# Patient Record
Sex: FEMALE | Race: OTHER | Employment: FULL TIME | URBAN - METROPOLITAN AREA
[De-identification: names, ages, dates, MRNs, and addresses within clinical notes are randomized per-mention and may not be internally consistent; named-entity substitution may affect disease eponyms.]

---

## 2017-04-24 ENCOUNTER — OFFICE VISIT (OUTPATIENT)
Dept: INTERNAL MEDICINE CLINIC | Age: 42
End: 2017-04-24

## 2017-04-24 VITALS
WEIGHT: 251 LBS | HEIGHT: 64 IN | TEMPERATURE: 98.6 F | HEART RATE: 80 BPM | OXYGEN SATURATION: 99 % | SYSTOLIC BLOOD PRESSURE: 124 MMHG | DIASTOLIC BLOOD PRESSURE: 82 MMHG | BODY MASS INDEX: 42.85 KG/M2 | RESPIRATION RATE: 18 BRPM

## 2017-04-24 DIAGNOSIS — M05.79 RHEUMATOID ARTHRITIS INVOLVING MULTIPLE SITES WITH POSITIVE RHEUMATOID FACTOR (HCC): Primary | ICD-10-CM

## 2017-04-24 DIAGNOSIS — R77.9 ELEVATED BLOOD PROTEIN: ICD-10-CM

## 2017-12-21 ENCOUNTER — OFFICE VISIT (OUTPATIENT)
Dept: INTERNAL MEDICINE CLINIC | Age: 42
End: 2017-12-21

## 2017-12-21 VITALS
DIASTOLIC BLOOD PRESSURE: 83 MMHG | RESPIRATION RATE: 18 BRPM | TEMPERATURE: 98.4 F | WEIGHT: 240 LBS | OXYGEN SATURATION: 99 % | HEIGHT: 64 IN | HEART RATE: 80 BPM | BODY MASS INDEX: 40.97 KG/M2 | SYSTOLIC BLOOD PRESSURE: 129 MMHG

## 2017-12-21 DIAGNOSIS — Z20.89 EXPOSURE TO MENINGITIS: Primary | ICD-10-CM

## 2017-12-21 PROBLEM — E66.01 OBESITY, MORBID (HCC): Status: ACTIVE | Noted: 2017-12-21

## 2017-12-21 RX ORDER — CIPROFLOXACIN 500 MG/1
500 TABLET ORAL DAILY
Qty: 1 TAB | Refills: 0 | Status: SHIPPED | OUTPATIENT
Start: 2017-12-21 | End: 2017-12-22

## 2017-12-21 NOTE — PROGRESS NOTES
HISTORY OF PRESENT ILLNESS  Mamadou Nath is a 43 y.o. female. HPI Ms. Bateman is here to discuss exposure to bacterial meningitis. She had seen her aunt Tuesday who was diagnosed today with bacterial meningitis. Her aunt had been having mental status changes. She helped cleaned up her house after she was taken to the hospital. She had hugged her but had no other contact with her. She states her aunt's doctor at the hospital advised the family members to be treated. She states she feels completely fine at this moment. Review of Systems   Constitutional: Negative for fever and malaise/fatigue. HENT: Negative. Eyes: Negative. Respiratory: Negative. Cardiovascular: Negative. Musculoskeletal: Negative for back pain and neck pain. Neurological: Negative for dizziness and headaches. Physical Exam   Constitutional: She is oriented to person, place, and time. She appears well-developed and well-nourished. No distress. Cardiovascular: Normal rate. Pulmonary/Chest: Effort normal.   Neurological: She is alert and oriented to person, place, and time. Psychiatric: She has a normal mood and affect. Her behavior is normal. Judgment and thought content normal.     Visit Vitals    /83 (BP 1 Location: Left arm, BP Patient Position: Sitting)    Pulse 80    Temp 98.4 °F (36.9 °C) (Oral)    Resp 18    Ht 5' 4\" (1.626 m)    Wt 240 lb (108.9 kg)    SpO2 99%    BMI 41.2 kg/m2       ASSESSMENT and PLAN    ICD-10-CM ICD-9-CM    1. Exposure to meningitis Z20.89 V01.89 ciprofloxacin HCl (CIPRO) 500 mg tablet   Discussed there are a few options for prophylactic treatment of bacterial meningitis. Will give the cipro 500 X1. I let her know that if her aunt's doctors suggest or advise that family members be treated differently, for her to let me know.

## 2017-12-21 NOTE — PROGRESS NOTES
Chief Complaint   Patient presents with    Other     Pts aunt was dx/ed with bacterial meningitis. She has been around her caring for her    1. Have you been to the ER, urgent care clinic since your last visit? Hospitalized since your last visit? No    2. Have you seen or consulted any other health care providers outside of the [address] [place] [address] since your last visit? Include any pap smears or colon screening.  No

## 2018-01-22 ENCOUNTER — OFFICE VISIT (OUTPATIENT)
Dept: INTERNAL MEDICINE CLINIC | Age: 43
End: 2018-01-22

## 2018-01-22 VITALS
HEART RATE: 84 BPM | WEIGHT: 244 LBS | OXYGEN SATURATION: 98 % | TEMPERATURE: 98.1 F | DIASTOLIC BLOOD PRESSURE: 76 MMHG | SYSTOLIC BLOOD PRESSURE: 119 MMHG | BODY MASS INDEX: 41.66 KG/M2 | RESPIRATION RATE: 18 BRPM | HEIGHT: 64 IN

## 2018-01-22 DIAGNOSIS — R09.89 SENSATION OF FOREIGN BODY IN THROAT: ICD-10-CM

## 2018-01-22 DIAGNOSIS — J39.2 THROAT IRRITATION: Primary | ICD-10-CM

## 2018-01-22 NOTE — MR AVS SNAPSHOT
Donnie Peoples 
 
 
 [facility] [address] [facility] [address] 
[phone] Patient: Demetria Parker MRN: [id number] WKT:[birth year] Visit Information Date & Time Provider Department Dept. Phone Encounter #  
 1/22/2018  4:00 PM Finesse Mora PA-C [facility]ain [phone] Follow-up Instructions Return if symptoms worsen or fail to improve. Upcoming Health Maintenance Date Due  
 PAP AKA CERVICAL CYTOLOGY 8/13/1996 Influenza Age 5 to Adult 8/1/2017 DTaP/Tdap/Td series (2 - Td) 4/24/2027 Allergies as of 1/22/2018  Review Complete On: 1/22/2018 By: Finesse Mora PA-C Severity Noted Reaction Type Reactions Ibuprofen  03/22/2013    Other (comments) Current Immunizations  Never Reviewed No immunizations on file. Not reviewed this visit You Were Diagnosed With   
  
 Codes Comments Throat irritation    -  Primary ICD-10-CM: J39.2 ICD-9-CM: 478.29 Sensation of foreign body in throat     ICD-10-CM: R09.89 ICD-9-CM: 784.99 Vitals BP Pulse Temp Resp Height(growth percentile) Weight(growth percentile) 119/76 (BP 1 Location: Left arm, BP Patient Position: Sitting) 84 98.1 °F (36.7 °C) (Oral) 18 5' 4\" (1.626 m) 244 lb (110.7 kg) LMP SpO2 BMI OB Status Smoking Status 01/19/2018 98% 41.88 kg/m2 Having regular periods Former Smoker Vitals History BMI and BSA Data Body Mass Index Body Surface Area  
 41.88 kg/m 2 2.24 m 2 Preferred Pharmacy Pharmacy Name Phone [facility] [address] [place] [address] [phone] Your Updated Medication List  
  
   
This list is accurate as of: 1/22/18  5:55 PM.  Always use your most recent med list.  
  
  
  
  
 certolizumab pegol 500 mg/2 mL (200 mg/mL x 2) Sykt injection Commonly known as:  CIMZIA  
200 mg by SubCUTAneous route once. One injection every other week. cholecalciferol (vitamin D3) 2,000 unit Tab Take  by mouth. methotrexate 2.5 mg tablet Commonly known as:  Madeleine Alken Take 15 mg by mouth Every Friday. ULTRAM  mg Tb24 Generic drug:  traMADol Take 100 mg by mouth daily. We Performed the Following REFERRAL TO ENT-OTOLARYNGOLOGY [EEL81 Custom] Comments:  
 Isaias EDMONDS or Dr. Jeffrey Alberto Follow-up Instructions Return if symptoms worsen or fail to improve. Referral Information Referral ID Referred By Referred To  
  
 [id number] [facility] Ice Not Available Visits Status Start Date End Date 1 New Request 1/22/18 1/22/19 If your referral has a status of pending review or denied, additional information will be sent to support the outcome of this decision. Introducing [facility]! Vamshi Mulligan introduces [employer] patient portal. Now you can access parts of your medical record, email your doctor's office, and request medication refills online. 1. In your internet browser, go to [email or web address] 2. Click on the First Time User? Click Here link in the Sign In box. You will see the New Member Sign Up page. 3. Enter your [employer] Access Code exactly as it appears below. You will not need to use this code after youve completed the sign-up process. If you do not sign up before the expiration date, you must request a new code. · [employer] Access Code: [email or web address] Expires: 3/21/2018  4:02 PM 
 
4. Enter the last four digits of your Social Security Number (xxxx) and Date of Birth (mm/dd/yyyy) as indicated and click Submit. You will be taken to the next sign-up page. 5. Create a [employer] ID. This will be your [employer] login ID and cannot be changed, so think of one that is secure and easy to remember. 6. Create a [employer] password. You can change your password at any time. 7. Enter your Password Reset Question and Answer.  This can be used at a later time if you forget your password. 8. Enter your e-mail address. You will receive e-mail notification when new information is available in [address] 9. Click Sign Up. You can now view and download portions of your medical record. 10. Click the Download Summary menu link to download a portable copy of your medical information. If you have questions, please visit the Frequently Asked Questions section of the [employer] website. Remember, [employer] is NOT to be used for urgent needs. For medical emergencies, dial 911. Now available from your iPhone and Android! Please provide this summary of care documentation to your next provider. Your primary care clinician is listed as Don Schroeder. If you have any questions after today's visit, please call [phone].

## 2018-01-22 NOTE — PROGRESS NOTES
HISTORY OF PRESENT ILLNESS  Tawana Miranda is a 43 y.o. female. HPI Ms. Bateman is here for 2 month history of sensation of something in her throat. She describes it as feeling like a pill is caught in her throat. She denies any trauma, vomiting, choking episode prior to onset of her sx. She denies hoarseness or coughing. She is a prior smoker. She does not have difficulty swallowing solids or liquids. She has had an endoscopy several years ago and was told she has a hiatal hernia. Review of Systems   Constitutional: Negative. HENT: Negative for congestion and sore throat. Respiratory: Negative for cough and shortness of breath. Cardiovascular: Negative. Gastrointestinal: Negative for heartburn. Physical Exam   Constitutional: She is oriented to person, place, and time. She appears well-developed and well-nourished. No distress. HENT:   Head: Normocephalic and atraumatic. Mouth/Throat: Posterior oropharyngeal erythema (mild erythema) present. No oropharyngeal exudate. Eyes: Conjunctivae are normal.   Neck: No thyromegaly present. Cardiovascular: Normal rate and regular rhythm. No murmur heard. Pulmonary/Chest: Effort normal and breath sounds normal. She has no wheezes. Lymphadenopathy:     She has no cervical adenopathy. Neurological: She is alert and oriented to person, place, and time. Visit Vitals    /76 (BP 1 Location: Left arm, BP Patient Position: Sitting)    Pulse 84    Temp 98.1 °F (36.7 °C) (Oral)    Resp 18    Ht 5' 4\" (1.626 m)    Wt 244 lb (110.7 kg)    SpO2 98%    BMI 41.88 kg/m2       ASSESSMENT and PLAN    ICD-10-CM ICD-9-CM    1. Throat irritation J39.2 478.29 REFERRAL TO ENT-OTOLARYNGOLOGY   2. Sensation of foreign body in throat R09.89 784.99 REFERRAL TO ENT-OTOLARYNGOLOGY     Pt verbalized understanding of their condition and diagnoses, treatment plan,  as well as side effects of any new medications prescribed.

## 2018-01-22 NOTE — PROGRESS NOTES
Chief Complaint   Patient presents with    Other     pt states she fells a small lump in the middle bottom of her throat      1. Have you been to the ER, urgent care clinic since your last visit? Hospitalized since your last visit? No    2. Have you seen or consulted any other health care providers outside of the [address] [place] [address] since your last visit? Include any pap smears or colon screening.  No

## 2018-10-24 ENCOUNTER — OFFICE VISIT (OUTPATIENT)
Dept: ORTHOPEDIC SURGERY | Age: 43
End: 2018-10-24

## 2018-10-24 VITALS
DIASTOLIC BLOOD PRESSURE: 88 MMHG | HEART RATE: 106 BPM | OXYGEN SATURATION: 97 % | WEIGHT: 250.6 LBS | BODY MASS INDEX: 42.78 KG/M2 | TEMPERATURE: 97.7 F | RESPIRATION RATE: 16 BRPM | SYSTOLIC BLOOD PRESSURE: 143 MMHG | HEIGHT: 64 IN

## 2018-10-24 DIAGNOSIS — M79.672 LEFT FOOT PAIN: ICD-10-CM

## 2018-10-24 DIAGNOSIS — M76.61 ACHILLES TENDINITIS OF RIGHT LOWER EXTREMITY: Primary | ICD-10-CM

## 2018-10-24 DIAGNOSIS — M25.571 ACUTE RIGHT ANKLE PAIN: ICD-10-CM

## 2018-10-24 RX ORDER — CELECOXIB 50 MG/1
50 CAPSULE ORAL 2 TIMES DAILY
COMMUNITY
End: 2018-11-14

## 2018-10-24 NOTE — PROGRESS NOTES
AMBULATORY PROGRESS NOTE      Patient: Bayron Gibbs             MRN: [id number]     SSN: [id number] Body mass index is 43.02 kg/m². YOB: 1975     AGE: 37 y.o. EX: female    PCP: Eric Mera PA-C     IMPRESSION/DIAGNOSIS AND TREATMENT PLAN     DIAGNOSES  1. Achilles tendinitis of right lower extremity    2. Acute right ankle pain    3. Left foot pain        Orders Placed This Encounter    AMB SUPPLY ORDER    [09868] Ankle Min 3V    [52363] Foot Min 3V      Birdie Bateman understands her diagnoses and the proposed plan. Plan:    1) DME Order: Short right CAM walker boot with rubber heel. 2) EUGENIE  [facility], has educated the patient on Achilles tendon specific exercises and/or stretches. 3) Work Note: Please allow Ms. Bateman to use her discretion to remain seated at work. RTO - 3 weeks     HPI AND EXAMINATION     Birdie Bateman IS A 37 y.o. female who presents to my outpatient office complaining of bilateral foot pain. Ms. Liss Watters reports that she has been having pain in her right Achilles tendon for the past few months. She notes it is painful to go up and down steps, which she must do frequently for her job. She had the past three days off from her work and returned today, and she reports that within an hour, her pain increased significantly . She further reports that she limps frequently. Ms. Liss Watters notes that she has been performing stretches and exercises, as she originally believed she had plantar fasciitis. The patient denies any recent falls, twists, or trauma. The patient denies exposure to fluoroquinolone. She recently went to her Rheumatologist, Dr. Helio Chaparro, who ordered an US of her achilles tendon and reports that she was told her Achilles tendon was very inflamed, but not torn. Additionally, the patient was recently diagnosed with psoriasis and her Rheumatologist believes she may have psoriatic arthritis.  The patient recently had tests returning negative for blood clots ordered by her Rheumatologist. XR imaging has been reviewed with the patient. The patient has a h/o of stomach ulcers and is currently taking Celebrex. 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 ANKLE/FOOT right    Psychiatry: Alert, Oriented x 3; Speech normal in context and clarity,            Memory intact grossly, no involuntary movements - tremors, no dementia  Gait: Normal  Tenderness: Mild tenderness to the non-insertional point of the Achilles tendon    Moderate tenderness to the insertional portion of Achilles tendon. Cutaneous: No redness or warmth  Joint Motion: Not tested at this time  Joint / Tendon Stability: No Ankle or Subtalar instability or joint laxity. No peroneal sublux ability or dislocation  Alignment: Forefoot, Midfoot, Hindfoot WNL. Neuro Motor/Sensory: NL/NL. Vascular: NL foot/ankle pulses. Lymphatics: No extremity lymphedema, No calf swelling, no tenderness to calf muscles. CHART REVIEW     Past Medical History:   Diagnosis Date    Arthritis     rheumatoid and psoriatic    Chronic pain      Current Outpatient Medications   Medication Sig    Celecoxib (CELEBREX) 50 mg capsule Take 50 mg by mouth two (2) times a day.  cholecalciferol, vitamin D3, 2,000 unit tab Take  by mouth.  certolizumab pegol (CIMZIA) 400 mg/2 mL (200 mg/mL x 2) sykt injection 200 mg by SubCUTAneous route once. One injection every other week.  traMADol (ULTRAM ER) 100 mg tablet Take 100 mg by mouth daily.  methotrexate (RHEUMATREX) 2.5 mg tablet Take 15 mg by mouth Every Friday. No current facility-administered medications for this visit.       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 Neg Weight Loss  Cardiovascular: Negative for chest pain, claudication and leg swelling. SOB, SANCHEZ   Gastrointestinal/Urological: Negative for  pain, N/V/D/C, Blood in stool or urine,dysuria                         Hematuria, Incontinence, pelvic pain  Musculoskeletal: see HPI. Neurological: Negative for dizziness and weakness, headaches,Visual Changes             Confusion,  Or Seizures,   Psychiatric/Behavioral: Negative for depression, memory loss and substance abuse. Extremities:  Negative for hair changes, rash or skin lesion changes. Hematologic: Negative for Bleeding problems, bruising, pallor or swollen lymph nodes. Peripheral Vascular: No calf pain, vascular vein tenderness to calf pain              No calf throbbing, posterior knee throbbing pain     DIAGNOSTIC IMAGING     ANKLE X RAYS 3 VIEWS Bilateral   10/24/2018    FINDINGS:     SOFT TISSUES:              Absent soft tissue swelling, No soft tissue calcifications, No Calcified blood vessels     Soft tissue swelling location:    None to fibula region        None medial aspect    None dorsal midfoot/forefoot  No radiopaque foreign body, abnormal lucency, or focal swelling noted within soft tissues.      OSSEOUS:     No fractures, subluxations, dislocations   Bone spur to inferior aspect of calcaneus is  mild   Bone spur to superior calcaneus region is           large SIZED CALCIFIC INSERTIONAL BONE SPUR IS PRESENT (RIGHT/LEFT)   Mineralization: suggests no Osteopenia or no Osteoporosis    ALIGNMENT:    Ankle mortise alignment is congruent. Tibial plafond and talar dome intact      No Osteochondral defects seen    No Ankle joint effusion seen         I have personally reviewed these images of the above study. The interpretation of this study is my professional opinion. Yasmany Mendoza MD  10/24/2018  5:23 PM      Please see above section of this report. I have personally reviewed the results of the above study. The interpretation of this study is my professional opinion. Written by Chris Keith, as dictated by Dr. Felicitas Curry. I, Dr. Felicitas Curry, confirm that all documentation is accurate.

## 2018-10-24 NOTE — LETTER
NOTIFICATION RETURN TO WORK / SCHOOL 
 
10/24/2018 4:15 PM 
 
Ms. Birdie Mehta [address] [address] To Whom It May Concern: 
 
Sloane Alexandre is currently under the care of [address] [place] [address] Jeffrey Eric. Please allow Ms. Bateman to use her discretion to remain seated at work. If there are questions or concerns please have the patient contact our office.  
 
 
 
Sincerely, 
 
 
Destinee Ambrocio MD

## 2018-10-24 NOTE — PATIENT INSTRUCTIONS
Please follow up in 3 weeks. You are advised to contact us if your condition worsens.          Achilles Tendinitis: Exercises     Your Care Instructions  Here are stretching and strengthening exercises for your achilles tendon. Start each exercise slowly. Ease off the exercise if you start to have pain. Your doctor or  will tell you when you can start these exercises and which ones will work best for you. Toe stretch    1. Sit in a chair, and extend your affected leg so that your heel is on the floor. 2. With your hand, reach down and pull your big toe up and back. Pull toward your ankle and away from the floor. 3. Hold the position for at least 20 to 30 seconds. 4. Repeat 3 times a session, up to 5 sessions a day. Towel stretch (calf)    1. Sit with your legs extended and knees straight. 2. Place a towel around your foot just under the toes. 3. Hold each end of the towel in each hand, with your hands above your knees. 4. Pull back with the towel so that your foot stretches toward you. 5. Hold the position for at least 20 to 30 seconds. 6. Repeat 3 times a session, up to 5 sessions a day. Floor stretch (calf)    1. Stand about 2 feet from a wall, and place your hands on the wall at about shoulder height. Or you can stand behind a chair, placing your hands on the back of it for balance. 2. Step back with the leg you want to stretch. Keep the leg straight, and press your heel into the floor with your toe turned slightly in.  3. Lean forward, and bend your other leg slightly. Feel the stretch in the Achilles tendon of your back leg. Hold for at least 15 to 30 seconds. Repeat with back knee slightly bent. 4. Repeat 3 times a session, up to 5 sessions a day. Stair stretch    1. Stand with the balls of both feet on the edge of a step or curb. With at least one hand, hold onto something solid for balance, such as a banister or handrail.   2. Keeping your affected leg straight, slowly let that heel hang down off of the step or curb until you feel a stretch in the back of your calf and/or Achilles area. Some of your weight should still be on the other leg. 3. Hold this position for at least 20 to 30 seconds. 4. Repeat 3 times a session, up to 5 times a day or whenever your Achilles tendon starts to feel tight. This stretch should also be repeated with your knee slightly bent. If too easy, progress to one leg at a time. Heel raises (eccentric emphasis)    1. Stand on a step with your heel off the edge of the step. Hold on to a handrail or wall for balance. 2. Push up on your toes, then slowly count to 10 as you lower yourself back down until your heel is below the step. If it hurts to push up on your toes, try putting most of your weight on your other foot as you push up, or try using your arms to help you. If you can't do this exercise without causing pain, stop the exercise and talk to your doctor. 3. Repeat the exercise 10 times. Repeat exercise with the knee slightly bent. If too easy, progress to one leg at a time. Ice Massage (pain relief)      Fill small paper cup with water and freeze until needed. When frozen and ready for use, tear off top half inch of cup, exposing the ice. Place ice directly on your heel cord (or wherever you have pain). Move the ice in a circular motion for up to 5 minutes (no more). Use towels to catch the water drippings. You should feel 4 stages of sensations starting with. ..  1. Uncomfortable sensation of cold, then  2. Stinging, then  3. Burning or aching feeling, then  4. Numbness    If the pain is too great to handle, lift it off your skin for a few seconds, dab with towel and then place it back on for a few circular motions and repeat.     **Do not perform for more than 5 minutes or you may run the risk of frost bite and cause death to the tissue.

## 2018-11-14 ENCOUNTER — OFFICE VISIT (OUTPATIENT)
Dept: ORTHOPEDIC SURGERY | Age: 43
End: 2018-11-14

## 2018-11-14 VITALS
TEMPERATURE: 97.4 F | BODY MASS INDEX: 42.85 KG/M2 | WEIGHT: 251 LBS | RESPIRATION RATE: 16 BRPM | SYSTOLIC BLOOD PRESSURE: 130 MMHG | HEIGHT: 64 IN | HEART RATE: 83 BPM | OXYGEN SATURATION: 99 % | DIASTOLIC BLOOD PRESSURE: 79 MMHG

## 2018-11-14 DIAGNOSIS — M76.61 ACHILLES TENDINITIS OF RIGHT LOWER EXTREMITY: Primary | ICD-10-CM

## 2018-11-14 RX ORDER — LIDOCAINE 50 MG/G
1 PATCH TOPICAL EVERY 24 HOURS
Qty: 1 PACKAGE | Refills: 0 | Status: SHIPPED | OUTPATIENT
Start: 2018-11-14 | End: 2021-12-22

## 2018-11-14 RX ORDER — DICLOFENAC SODIUM 10 MG/G
4 GEL TOPICAL 2 TIMES DAILY
Qty: 100 G | Refills: 2 | Status: SHIPPED | OUTPATIENT
Start: 2018-11-14 | End: 2021-12-22

## 2018-11-14 NOTE — PROGRESS NOTES
AMBULATORY PROGRESS NOTE      Patient: Cristin Cho             MRN: [id number]     SSN: [id number] Body mass index is 43.08 kg/m². YOB: 1975     AGE: 37 y.o. EX: female    PCP: Keily Desai PA-C     IMPRESSION/DIAGNOSIS AND TREATMENT PLAN     DIAGNOSES  1. Achilles tendinitis of right lower extremity        Orders Placed This Encounter    AMB SUPPLY ORDER    REFERRAL TO PHYSICAL THERAPY      Birdie Bateman understands her diagnoses and the proposed plan. Plan:    1) Referral to Physical Therapy. 2) Voltaren 1% gel: 4 g BID; 100 g, 2 refill. 3) Lidoderm 5% Patch: Cut to fit Q24HS; 1 Patch, 0 refill. 4) Wean CAM walker boot. 5) DME Order: Visco heel inserts (LMS). 6) Continue activity as tolerated. RTO - 3 weeks     HPI AND EXAMINATION     Birdie Bateman IS A 37 y.o. female who presents to my outpatient office for follow up of Achilles tendinitis of the RLE. At the last visit, I provided an order for a short right CAM walker boot with rubber heel, an HEP with Achilles tendon specific exercises, and a work note. Since we saw her last, Ms. Rach Campos states that she is in the same level pain as she was before, which has been occurring for 3 months. She describes this pain as a burning sensation. She reports that she has been doing her exercises as directed. She notes that she was doing these exercises the other day and heard a pop in the bottom of her right foot, which resulted in a knot. She now has pain and soreness in the bottom of her foot. She denies any bruising when this occurred. She presents in the office today with her CAM walker boot. Of note, she had US of a tendon in her wrist at [facility] recently. The patient states she uses Voltaren gel on her hand and notes that it helps. The patient has a h/o of stomach ulcers and RA, and is currently taking Celebrex.  She used to take Plaquenil, which she discontinued due to the stomach ulcers.      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 Visit Vitals  /79   Pulse 83   Temp 97.4 °F (36.3 °C) (Oral)   Resp 16   Ht 5' 4\" (1.626 m)   Wt 251 lb (113.9 kg)   SpO2 99%   BMI 43.08 kg/m²     ANKLE/FOOT right     Psychiatry: Alert, Oriented x 3; Speech normal in context and clarity,                       Memory intact grossly, no involuntary movements - tremors, no dementia  Gait: Normal  Tenderness: Mild tenderness to the non-insertional point of the Achilles tendon                          Moderate tenderness to the insertional portion of Achilles tendon. Mild tenderness to plantar fascia, medial cord  Cutaneous: No redness or warmth  Joint Motion: Not tested at this time  Joint / Tendon Stability: No Ankle or Subtalar instability or joint laxity. No peroneal sublux ability or dislocation  Alignment: Forefoot, Midfoot, Hindfoot WNL. Neuro Motor/Sensory: NL/NL. Vascular: NL foot/ankle pulses. Lymphatics: No extremity lymphedema, No calf swelling, no tenderness to calf muscles. CHART REVIEW     Past Medical History:   Diagnosis Date    Arthritis     rheumatoid and psoriatic    Chronic pain      Current Outpatient Medications   Medication Sig    Celecoxib (CELEBREX) 50 mg capsule Take 50 mg by mouth two (2) times a day.  cholecalciferol, vitamin D3, 2,000 unit tab Take  by mouth.  certolizumab pegol (CIMZIA) 400 mg/2 mL (200 mg/mL x 2) sykt injection 200 mg by SubCUTAneous route once. One injection every other week.  methotrexate (RHEUMATREX) 2.5 mg tablet Take 15 mg by mouth Every Friday.  traMADol (ULTRAM ER) 100 mg tablet Take 100 mg by mouth daily. No current facility-administered medications for this visit.       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 Neg Weight Loss  Cardiovascular: Negative for chest pain, claudication and leg swelling. SOB, SANCHEZ   Gastrointestinal/Urological: Negative for  pain, N/V/D/C, Blood in stool or urine,dysuria                         Hematuria, Incontinence, pelvic pain  Musculoskeletal: see HPI. Neurological: Negative for dizziness and weakness, headaches,Visual Changes             Confusion,  Or Seizures,   Psychiatric/Behavioral: Negative for depression, memory loss and substance abuse. Extremities:  Negative for hair changes, rash or skin lesion changes. Hematologic: Negative for Bleeding problems, bruising, pallor or swollen lymph nodes. Peripheral Vascular: No calf pain, vascular vein tenderness to calf pain              No calf throbbing, posterior knee throbbing pain     DIAGNOSTIC IMAGING     No notes on file    Please see above section of this report. I have personally reviewed the results of the above study. The interpretation of this study is my professional opinion. Written by Chema Maradiaga, as dictated by Dr. Melvina Heller. I, Dr. Melvina Heller, confirm that all documentation is accurate.

## 2018-11-14 NOTE — PATIENT INSTRUCTIONS
Please follow up in 3 weeks. You are advised to contact us if your condition worsens. You have been provided with an order for durable medical equipment that you may  at an outside facility as our office does not carry the equipment you need. You may pick it up at any medical supply company you like. Listed below are a few different locations for your convenience:    [address]  [address], [address]  Phone: [phone] [address] [facility],[address]  [address], [facility] [address]  Phone: [phone]  Phone: [phone]  [place]:   [address], [address] [place]:  [address] [facility] [address]  [place]:  [facility],[address] [place], [address]      Achilles Tendon: Exercises  Your Care Instructions  Here are some examples of exercises for your achilles tendon. Start each exercise slowly. Ease off the exercise if you start to have pain. Your doctor or physical therapist will tell you when you can start these exercises and which ones will work best for you. Toe stretch  Toe stretch    1. Sit in a chair, and extend your affected leg so that your heel is on the floor. 2. With your hand, reach down and pull your big toe up and back. Pull toward your ankle and away from the floor. 3. Hold the position for at least 15 to 30 seconds. 4. Repeat 2 to 4 times a session, several times a day. Calf-plantar fascia stretch    1. Sit with your legs extended and knees straight. 2. Place a towel around your foot just under the toes. 3. Hold each end of the towel in each hand, with your hands above your knees. 4. Pull back with the towel so that your foot stretches toward you. 5. Hold the position for at least 15 to 30 seconds. 6. Repeat 2 to 4 times a session, up to 5 sessions a day.     Floor stretch    1. Stand about 2 feet from a wall, and place your hands on the wall at about shoulder height. Or you can stand behind a chair, placing your hands on the back of it for balance. 2. Step back with the leg you want to stretch. Keep the leg straight, and press your heel into the floor with your toe turned slightly in.  3. Lean forward, and bend your other leg slightly. Feel the stretch in the Achilles tendon of your back leg. Hold for at least 15 to 30 seconds. 4. Repeat 2 to 4 times a session, up to 5 sessions a day. Stair stretch    1. Stand with the balls of both feet on the edge of a step or curb (or a medium-sized phone book). With at least one hand, hold onto something solid for balance, such as a banister or handrail. 2. Keeping your affected leg straight, slowly let that heel hang down off of the step or curb until you feel a stretch in the back of your calf and/or Achilles area. Some of your weight should still be on the other leg. 3. Hold this position for at least 15 to 30 seconds. 4. Repeat 2 to 4 times a session, up to 5 times a day or whenever your Achilles tendon starts to feel tight. This stretch can also be done with your knee slightly bent. Strength exercise    1. This exercise will get you started on building strength after an Achilles tendon injury. Your doctor or physical therapist can help you move on to more challenging exercises as you heal and get stronger. 2. Stand on a step with your heel off the edge of the step. Hold on to a handrail or wall for balance. 3. Push up on your toes, then slowly count to 10 as you lower yourself back down until your heel is below the step. If it hurts to push up on your toes, try putting most of your weight on your other foot as you push up, or try using your arms to help you. If you can't do this exercise without causing pain, stop the exercise and talk to your doctor. 4. Repeat the exercise 8 to 12 times, half with the knee straight and half with the knee bent.     Follow-up care is a key part of your treatment and safety. Be sure to make and go to all appointments, and call your doctor if you are having problems. It's also a good idea to know your test results and keep a list of the medicines you take. Where can you learn more? Go to http://marlene-willard.info/. Enter N052 in the search box to learn more about \"Achilles Tendon: Exercises. \"  Current as of: November 29, 2017  Content Version: 11.8  © 0072-5777 Healthwise, [employer]. Care instructions adapted under license by [employer] (which disclaims liability or warranty for this information). If you have questions about a medical condition or this instruction, always ask your healthcare professional. [address] any warranty or liability for your use of this information.

## 2018-11-14 NOTE — PROGRESS NOTES
Verbal order given by Dr. Cassia Pierre to sign order for PT and DME entered by [facility].

## 2018-11-15 ENCOUNTER — APPOINTMENT (OUTPATIENT)
Dept: PHYSICAL THERAPY | Age: 43
End: 2018-11-15

## 2018-11-26 ENCOUNTER — HOSPITAL ENCOUNTER (OUTPATIENT)
Dept: PHYSICAL THERAPY | Age: 43
Discharge: HOME | End: 2018-11-26
Payer: COMMERCIAL

## 2018-11-26 PROCEDURE — 97110 THERAPEUTIC EXERCISES: CPT

## 2018-11-26 PROCEDURE — 97162 PT EVAL MOD COMPLEX 30 MIN: CPT

## 2018-11-26 NOTE — PROGRESS NOTES
[facility] [address] [place] [address]  [address], [address] [place] [address], [address]  Phone: [phone]  Fax: [phone] / [address] [address] PHYSICAL THERAPY SERVICES  Patient Name: Irma Campbell : 1975   Medical   Diagnosis: Achilles tendinitis, right leg [M76.61] Treatment Diagnosis: R heel pain   Onset Date: 2 months prior to eval     Referral Source: Prince Raúl MD Start of [facility]): 2018   Prior Hospitalization: See medical history Provider #: [id number]   Prior Level of Function: Walked dog > 1mi daily   Comorbidities: RA, L calcaneal bone spurs   Medications: Verified on Patient Summary List   The Plan of Care and following information is based on the information from the initial evaluation.   ===========================================================================================  Assessment / key information: Patient is a 37 y.o. female who presents to [facility] with diagnosis of R Achilles tendinitis. Patient reports onset of R Achilles T pain beginning 2 months ago, insidious onset. She reports she wore CAM boot for 4 weeks but SX worsened due to pressure of boot on heel. X-rays reveal 2 calcaneal bone spurs (@ AT insertion and sub calc) per pt report. Pain is worse by end of work day while walking and after sitting >15 min. Objective PT examination findings include (1) dec ankle AROM DF 0 deg with p! (2) dec strength INV/DF/PF 3+/5 p!, EV 4/5 (3) dec length gastroc/soleus complex (4) swelling along AT insertion (5) hypomobile calcaneal med/lat glides. A home exercise program was demonstrated and provided to address the above objective and functional deficits. Patient can benefit from skilled PT interventions to improve flexibility, decrease pain, to facilitate performance of ADLs & improve overall functional status. ===========================================================================================  Eval Complexity: History MEDIUM  Complexity : 1-2 comorbidities / personal factors will impact the outcome/ POC ;  Examination  MEDIUM Complexity : 3 Standardized tests and measures addressing body structure, function, activity limitation and / or participation in recreation ; Presentation LOW Complexity : Stable, uncomplicated ;  Decision Making MEDIUM Complexity : FOTO score of 26-74; Overall Complexity LOW   Problem List: pain affecting function, decrease ROM, decrease strength, edema affecting function, impaired gait/ balance, decrease ADL/ functional abilitiies, decrease activity tolerance and decrease flexibility/ joint mobility, FOTO score 47  Treatment Plan may include any combination of the following: Therapeutic exercise, Therapeutic activities, Neuromuscular re-education, Physical agent/modality, Gait/balance training, Manual therapy including dry needling, Aquatic therapy and Patient education  Patient / Family readiness to learn indicated by: asking questions, trying to perform skills and interest  Persons(s) to be included in education: patient (P)  Barriers to Learning/Limitations: no  Measures taken:    Patient Goal (s): \"pain free walking\"   Patient self reported health status: good  Rehabilitation Potential: good   Short Term Goals: To be accomplished in  1-2  weeks:  1) Patient to be independent and compliant with initial HEP to prevent further disability. 2) Improve FOTO score to 57 indicating significant functional improvement. 3) Patient will report decreased c/o pain to < or = 2/10 to facilitate ease with walking with manageable sx in R heel. 4) Improve R ankle DF AROM to >/= 5 deg so ROM is available for improved gait mechanics   Long Term Goals:  To be accomplished in  3-4  weeks:  1) Patient to be independent & compliant with a progressive, high level HEP in order to maintain gains made in physical therapy. 2) Improve FOTO score to 67 indicating significant functional improvement in order to return to PLOF. 3) Patient to tolerate walking 0.5 mi without exacerbation of SX to return to walking her dog daily. Frequency / Duration:   Patient to be seen  2-3  times per week for 3-4  weeks:  Patient / Caregiver education and instruction: self care, activity modification and exercises  G-Codes (GP): NA  Therapist Signature: Gary Reed PT, DPT, MTC, CMTPT Date: 22/38/2288   Certification Period: NA Time: 8:06 AM   ===========================================================================================  I certify that the above Physical Therapy Services are being furnished while the patient is under my care. I agree with the treatment plan and certify that this therapy is necessary. Physician Signature:        Date:       Time:     Please sign and return to [facility] [place] or you may fax the signed copy to [phone]. Thank you.

## 2018-11-27 NOTE — PROGRESS NOTES
PHYSICAL THERAPY - DAILY TREATMENT NOTE    Patient Name: Cristin Cho        Date: 2018  : 1975   YES Patient  Verified  Visit #:     Insurance: Payor: BLUE CROSS / Plan: [facility] PPO / Product Type: PPO /      In time: 800 Out time: 855   Total Treatment Time: 55     Medicare/BCBS Time Tracking (below)   Total Timed Codes (min):  45 1:1 Treatment Time:  45     TREATMENT AREA =  Achilles tendinitis, right leg [M76.61]  SUBJECTIVE  Pain Level (on 0 to 10 scale):  5  / 10   Medication Changes/New allergies or changes in medical history, any new surgeries or procedures?     NO    If yes, update Summary List   Subjective Functional Status/Changes:  []  No changes reported     See POC          OBJECTIVE  Modalities Rationale:     decrease inflammation, decrease pain and increase tissue extensibility to improve patient's ability to perform functional ADLs   min [] Estim, type/location:                                      []  att     []  unatt     []  w/US     []  w/ice    []  w/heat    min []  Mechanical Traction: type/lbs                   []  pro   []  sup   []  int   []  cont    []  before manual    []  after manual    min []  Ultrasound, settings/location:      min []  Iontophoresis w/ dexamethasone, location:                                               []  take home patch       []  in clinic   10 min [x]  Ice     []  Heat    location/position: R heel/AT in L/S    min []  Vasopneumatic Device, press/temp:     min []  Other:    [] Skin assessment post-treatment (if applicable):    []  intact    []  redness- no adverse reaction     []redness - adverse reaction:        10 min Therapeutic Exercise:  [x]  See flow sheet   Rationale:      increase ROM and increase strength to improve the patients ability to regain full functional mobility of R foot/ankle for improved walking tolerance      min Manual Therapy: Technique:      [] S/DTM []IASTM []PROM [] Passive Stretching []manual TPR  [] TDN (see objective; actual needle insertion time not billed)  []Jt manipulation:Gr I [] II []  III [] IV[] V[]  Treatment/Area:     Rationale:      decrease pain, increase ROM, increase tissue extensibility and decrease trigger points to improve patient's ability to      min Therapeutic Activity:    Rationale:    increase strength, improve coordination and increase proprioception to improve the patients ability to      min Neuromuscular Re-ed:    Rationale:    improve coordination, improve balance and increase proprioception to improve the patients ability to      min Gait Training:    Rationale:       min Patient Education:  YES  Reviewed HEP   [x]  Progressed/Changed HEP based on:   Issued written HEP and reviewed     Other Objective/Functional Measures:    See POC  TE per FS     Post Treatment Pain Level (on 0 to 10) scale:   3  / 10     ASSESSMENT  Assessment/Changes in Function:     Progressed treatment as appropriate with good patient tolerance     []  See Progress Note/Recertification   Patient will continue to benefit from skilled PT services to modify and progress therapeutic interventions, address functional mobility deficits, address ROM deficits, address strength deficits, analyze and address soft tissue restrictions, analyze and cue movement patterns, analyze and modify body mechanics/ergonomics and assess and modify postural abnormalities to attain remaining goals.    Progress toward goals / Updated goals:    Progressing towards goals established in POC     PLAN  [x]  Upgrade activities as tolerated YES Continue plan of care   []  Discharge due to :    []  Other:      Therapist: Fady Maldonado PT, DPT, MTC, CMTPT    Date: 11/26/2018 Time: 7:09 PM     Future Appointments   Date Time Provider Attila Ford   11/30/2018 12:30 PM Lexi Bey PT [facility]   12/4/2018  8:30 AM Lexi Bey PT [facility]   12/5/2018  7:40 AM MD Amanda Gan 69   12/7/2018 8:00 AM Maryl Gilford, PT [facility]   12/10/2018  4:30 PM Maryl Gilford, PT [facility]   12/12/2018  5:00 PM Maryl Gilford, PT [facility]   12/17/2018  4:30 PM Maryl Gilford, PT [facility]   12/19/2018  5:00 PM Maryl Gilford, PT [facility]

## 2018-11-30 ENCOUNTER — HOSPITAL ENCOUNTER (OUTPATIENT)
Dept: PHYSICAL THERAPY | Age: 43
Discharge: HOME | End: 2018-11-30
Payer: COMMERCIAL

## 2018-11-30 PROCEDURE — 97110 THERAPEUTIC EXERCISES: CPT

## 2018-11-30 NOTE — PROGRESS NOTES
PHYSICAL THERAPY - DAILY TREATMENT NOTE    Patient Name: Campbell Arias        Date: 2018  : 1975   YES Patient  Verified  Visit #:      of   12  Insurance: Payor: BLUE CROSS / Plan: [facility] PPO / Product Type: PPO /      In time: 1240 Out time: 130   Total Treatment Time: 50     Medicare/BCBS Time Tracking (below)   Total Timed Codes (min):  40 1:1 Treatment Time:  25     TREATMENT AREA =  Achilles tendinitis, right leg [M76.61]  SUBJECTIVE  Pain Level (on 0 to 10 scale):  3-4  / 10   Medication Changes/New allergies or changes in medical history, any new surgeries or procedures? NO    If yes, update Summary List   Subjective Functional Status/Changes:  []  No changes reported     I bought the heel lifts and have been icing a lot.  The stretches dont hurt as much         OBJECTIVE  Modalities Rationale:     decrease inflammation, decrease pain and increase tissue extensibility to improve patient's ability to perform functional ADLs   min [] Estim, type/location:                                      []  att     []  unatt     []  w/US     []  w/ice    []  w/heat    min []  Mechanical Traction: type/lbs                   []  pro   []  sup   []  int   []  cont    []  before manual    []  after manual    min []  Ultrasound, settings/location:      min []  Iontophoresis w/ dexamethasone, location:                                               []  take home patch       []  in clinic   10 min [x]  Ice     []  Heat    location/position: R heel/AT in L/S    min []  Vasopneumatic Device, press/temp:     min []  Other:    [] Skin assessment post-treatment (if applicable):    []  intact    []  redness- no adverse reaction     []redness - adverse reaction:        40/25 min Therapeutic Exercise:  [x]  See flow sheet   Rationale:      increase ROM and increase strength to improve the patients ability to regain full functional mobility of R foot/ankle for improved walking tolerance      min Manual Therapy: Technique:      [] S/DTM []IASTM []PROM [] Passive Stretching   []manual TPR  [] TDN (see objective; actual needle insertion time not billed)  []Jt manipulation:Gr I [] II []  III [] IV[] V[]  Treatment/Area:     Rationale:      decrease pain, increase ROM, increase tissue extensibility and decrease trigger points to improve patient's ability to      min Therapeutic Activity:    Rationale:    increase strength, improve coordination and increase proprioception to improve the patients ability to      min Neuromuscular Re-ed:    Rationale:    improve coordination, improve balance and increase proprioception to improve the patients ability to      min Gait Training:    Rationale:       min Patient Education:  YES  Reviewed HEP   [x]  Progressed/Changed HEP based on:   Issued written HEP and reviewed     Other Objective/Functional Measures:    TE per FS, see FS for added TE  AROM pain free at end range, min c/o pain at end range during ankle tband but able to manage with limited ROM     Post Treatment Pain Level (on 0 to 10) scale:   2  / 10     ASSESSMENT  Assessment/Changes in Function:     Progressed treatment as appropriate with good patient tolerance     []  See Progress Note/Recertification   Patient will continue to benefit from skilled PT services to modify and progress therapeutic interventions, address functional mobility deficits, address ROM deficits, address strength deficits, analyze and address soft tissue restrictions, analyze and cue movement patterns, analyze and modify body mechanics/ergonomics and assess and modify postural abnormalities to attain remaining goals.    Progress toward goals / Updated goals:    Progressing towards STG1     PLAN  [x]  Upgrade activities as tolerated YES Continue plan of care   []  Discharge due to :    []  Other:      Therapist: Eulalio Shelton, PT, DPT, MTC, CMTPT    Date: 11/30/2018 Time: 7:09 PM     Future Appointments   Date Time Provider Attila Ford 12/4/2018  8:30 AM Lucia Raygoza, PT [facility] [place]   12/5/2018  7:40 AM Merced Wen MD [place] [id number]   12/7/2018  8:00 AM Lucia Raygoza, PT [facility] [place]   12/10/2018  4:30 PM Lucia Raygoza, PT [facility] [place]   12/12/2018  5:00 PM Lucia Raygoza, PT [facility] [place]   12/17/2018  4:30 PM Lucia Raygoza, PT [facility] [place]   12/19/2018  5:00 PM Lucia Raygoza, PT [facility] [place]

## 2018-12-04 ENCOUNTER — HOSPITAL ENCOUNTER (OUTPATIENT)
Dept: PHYSICAL THERAPY | Age: 43
Discharge: HOME | End: 2018-12-04
Payer: COMMERCIAL

## 2018-12-04 PROCEDURE — 97110 THERAPEUTIC EXERCISES: CPT

## 2018-12-04 NOTE — PROGRESS NOTES
PHYSICAL THERAPY - DAILY TREATMENT NOTE    Patient Name: Cristin Cho        Date: 2018  : 1975   YES Patient  Verified  Visit #:   3   of   12  Insurance: Payor: [employer] / Plan: [facility] PPO / Product Type: PPO /      In time: 830 Out time: 930   Total Treatment Time: 55     Medicare/BCBS Time Tracking (below)   Total Timed Codes (min):  45 1:1 Treatment Time:  30     TREATMENT AREA =  Achilles tendinitis, right leg [M76.61]  SUBJECTIVE  Pain Level (on 0 to 10 scale):  3  / 10   Medication Changes/New allergies or changes in medical history, any new surgeries or procedures?     NO    If yes, update Summary List   Subjective Functional Status/Changes:  []  No changes reported     More sore today but I think its the RA but since this flair up the tband [INV] has been hurting       OBJECTIVE  Modalities Rationale:     decrease inflammation, decrease pain and increase tissue extensibility to improve patient's ability to perform functional ADLs   min [] Estim, type/location:                                      []  att     []  unatt     []  w/US     []  w/ice    []  w/heat    min []  Mechanical Traction: type/lbs                   []  pro   []  sup   []  int   []  cont    []  before manual    []  after manual    min []  Ultrasound, settings/location:      min []  Iontophoresis w/ dexamethasone, location:                                               []  take home patch       []  in clinic   10 min [x]  Ice     []  Heat    location/position: R heel/AT in L/S    min []  Vasopneumatic Device, press/temp:     min []  Other:    [] Skin assessment post-treatment (if applicable):    []  intact    []  redness- no adverse reaction     []redness - adverse reaction:        45/30 min Therapeutic Exercise:  [x]  See flow sheet   Rationale:      increase ROM and increase strength to improve the patients ability to regain full functional mobility of R foot/ankle for improved walking tolerance      min Manual Therapy: Technique:      [] S/DTM []IASTM []PROM [] Passive Stretching   []manual TPR  [] TDN (see objective; actual needle insertion time not billed)  []Jt manipulation:Gr I [] II []  III [] IV[] V[]  Treatment/Area:     Rationale:      decrease pain, increase ROM, increase tissue extensibility and decrease trigger points to improve patient's ability to      min Therapeutic Activity:    Rationale:    increase strength, improve coordination and increase proprioception to improve the patients ability to      min Neuromuscular Re-ed:    Rationale:    improve coordination, improve balance and increase proprioception to improve the patients ability to      min Gait Training:    Rationale:       min Patient Education:  YES  Reviewed HEP   []  Progressed/Changed HEP based on: Other Objective/Functional Measures:    TE per FS, held tband INV  Added incline stretch, HR and SLS     Post Treatment Pain Level (on 0 to 10) scale:   2  / 10     ASSESSMENT  Assessment/Changes in Function:     Progressed treatment as appropriate with good patient tolerance to [address] TE. Improving ecc control during tband but cont to require cueing for slowed/smooth mvmt through INV/EV ROM     []  See Progress Note/Recertification   Patient will continue to benefit from skilled PT services to modify and progress therapeutic interventions, address functional mobility deficits, address ROM deficits, address strength deficits, analyze and address soft tissue restrictions, analyze and cue movement patterns, analyze and modify body mechanics/ergonomics and assess and modify postural abnormalities to attain remaining goals.    Progress toward goals / Updated goals:    Progressing towards STG1, requires min cueing     PLAN  [x]  Upgrade activities as tolerated YES Continue plan of care   []  Discharge due to :    []  Other:      Therapist: Paula Alvarez, PT, DPT, MTC, CMTPT    Date: 12/4/2018 Time: 7:09 PM     Future Appointments   Date Time Provider Attila Ford   12/5/2018  7:40 AM Kristel Edmondson MD [place] [address]   12/7/2018  8:00 AM Delma Ulrich, PT [facility] [place]   12/10/2018  4:30 PM Delma Ulrich, PT [facility] [place]   12/12/2018  5:00 PM Delma Ulrich, PT [facility] [place]   12/17/2018  4:30 PM Delma Ulrich, PT [facility] [place]   12/19/2018  5:00 PM Delma Ulrich, PT [facility] [place]

## 2018-12-05 ENCOUNTER — OFFICE VISIT (OUTPATIENT)
Dept: ORTHOPEDIC SURGERY | Age: 43
End: 2018-12-05

## 2018-12-05 VITALS
HEART RATE: 86 BPM | OXYGEN SATURATION: 97 % | BODY MASS INDEX: 43.09 KG/M2 | WEIGHT: 252.4 LBS | RESPIRATION RATE: 16 BRPM | HEIGHT: 64 IN | SYSTOLIC BLOOD PRESSURE: 135 MMHG | DIASTOLIC BLOOD PRESSURE: 80 MMHG

## 2018-12-05 DIAGNOSIS — M76.61 ACHILLES TENDINITIS OF RIGHT LOWER EXTREMITY: Primary | ICD-10-CM

## 2018-12-05 NOTE — PROGRESS NOTES
AMBULATORY PROGRESS NOTE      Patient: Milena Wild             MRN: [id number]     SSN: [id number] Body mass index is 43.32 kg/m². YOB: 1975     AGE: 37 y.o. EX: female    PCP: Dora Winkler PA-C     IMPRESSION/DIAGNOSIS AND TREATMENT PLAN     DIAGNOSES  1. Achilles tendinitis of right lower extremity        No orders of the defined types were placed in this encounter. Milena Wild understands her diagnoses and the proposed plan. Plan:    1) Complete formal PT as directed. 2) Continue HEP and cryotherapy as directed. 3) Continue activity as tolerated. RTO - 6 weeks     HPI AND EXAMINATION     Birdie Bateman IS A 37 y.o. female who presents to my outpatient office for follow up of Achilles tendinitis of the RLE. At the last visit, I prescribed Voltaren 1% gel and Lidoderm 5% patch, provided an order for Visco heel inserts, provided a referral to Physical Therapy, instructed the patient to continue activity as tolerated, and to wean the CAM walker boot. Since we saw her last, Ms. Ruperto Gonzalez states that she believes she is doing better. She reports that she has been performing her HEP and using cryotherapy, and she states that she has completed 4 sessions of formal PT. Her physical therapist instructed her to ice her Achilles tendon once an hour for 10 minutes and to stretch every time before she stands up or gets out of bed. However, despite these interventions, she still experiences the burning pain in her right Achilles tendon. The patient is no longer taking Celebrex.       The patient has a h/o of stomach ulcers and RA. She used to take Plaquenil, which she discontinued due to the stomach ulcers.      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     Visit Vitals  /80   Pulse 86   Resp 16   Ht 5' 4\" (1.626 m)   Wt 114.5 kg (252 lb 6.4 oz)   SpO2 97%   BMI 43.32 kg/m²      ANKLE/FOOT right     Psychiatry: Alert, Oriented x 3; Speech normal in context and clarity,                       Memory intact grossly, no involuntary movements - tremors, no dementia  Gait: Normal  Tenderness: Mild tenderness to the non-insertional point of the Achilles tendon                          Mild tenderness to the insertional portion of Achilles tendon. Mild tenderness to plantar fascia, medial cord  Cutaneous: Mild swelling to the Achilles tendon. Joint Motion: Not tested at this time  Joint / Tendon Stability: No Ankle or Subtalar instability or joint laxity.                                           No peroneal sublux ability or dislocation  Alignment: Forefoot, Midfoot, Hindfoot WNL. Neuro Motor/Sensory: NL/NL. Vascular: NL foot/ankle pulses. Lymphatics: No extremity lymphedema, No calf swelling, no tenderness to calf muscles. CHART REVIEW     Past Medical History:   Diagnosis Date    Arthritis     rheumatoid and psoriatic    Chronic pain      Current Outpatient Medications   Medication Sig    diclofenac (VOLTAREN) 1 % gel Apply 4 g to affected area two (2) times a day. Apply to affected area as directed.  lidocaine (LIDODERM) 5 % 1 Patch by TransDERmal route every twenty-four (24) hours. Apply patch to the affected area for 12 hours a day and remove for 12 hours a day.  cholecalciferol, vitamin D3, 2,000 unit tab Take  by mouth.  certolizumab pegol (CIMZIA) 400 mg/2 mL (200 mg/mL x 2) sykt injection 200 mg by SubCUTAneous route once. One injection every other week.  methotrexate (RHEUMATREX) 2.5 mg tablet Take 15 mg by mouth Every Friday.  traMADol (ULTRAM ER) 100 mg tablet Take 100 mg by mouth daily. No current facility-administered medications for this visit.       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 Neg Weight Loss  Cardiovascular: Negative for chest pain, claudication and leg swelling. SOB, SANCHEZ   Gastrointestinal/Urological: Negative for  pain, N/V/D/C, Blood in stool or urine,dysuria                         Hematuria, Incontinence, pelvic pain  Musculoskeletal: see HPI. Neurological: Negative for dizziness and weakness, headaches,Visual Changes             Confusion,  Or Seizures,   Psychiatric/Behavioral: Negative for depression, memory loss and substance abuse. Extremities:  Negative for hair changes, rash or skin lesion changes. Hematologic: Negative for Bleeding problems, bruising, pallor or swollen lymph nodes. Peripheral Vascular: No calf pain, vascular vein tenderness to calf pain              No calf throbbing, posterior knee throbbing pain     DIAGNOSTIC IMAGING     No notes on file    Please see above section of this report. I have personally reviewed the results of the above study. The interpretation of this study is my professional opinion. Written by Adelia Khan, as dictated by Dr. Sandro Sibley. I, Dr. Sandro Sibley, confirm that all documentation is accurate.

## 2018-12-05 NOTE — PATIENT INSTRUCTIONS
Please follow up in 6 weeks. You are advised to contact us if your condition worsens. Achilles Tendon: Exercises  Your Care Instructions  Here are some examples of exercises for your achilles tendon. Start each exercise slowly. Ease off the exercise if you start to have pain. Your doctor or physical therapist will tell you when you can start these exercises and which ones will work best for you. Toe stretch  Toe stretch    1. Sit in a chair, and extend your affected leg so that your heel is on the floor. 2. With your hand, reach down and pull your big toe up and back. Pull toward your ankle and away from the floor. 3. Hold the position for at least 15 to 30 seconds. 4. Repeat 2 to 4 times a session, several times a day. Calf-plantar fascia stretch    1. Sit with your legs extended and knees straight. 2. Place a towel around your foot just under the toes. 3. Hold each end of the towel in each hand, with your hands above your knees. 4. Pull back with the towel so that your foot stretches toward you. 5. Hold the position for at least 15 to 30 seconds. 6. Repeat 2 to 4 times a session, up to 5 sessions a day. Floor stretch    1. Stand about 2 feet from a wall, and place your hands on the wall at about shoulder height. Or you can stand behind a chair, placing your hands on the back of it for balance. 2. Step back with the leg you want to stretch. Keep the leg straight, and press your heel into the floor with your toe turned slightly in.  3. Lean forward, and bend your other leg slightly. Feel the stretch in the Achilles tendon of your back leg. Hold for at least 15 to 30 seconds. 4. Repeat 2 to 4 times a session, up to 5 sessions a day. Stair stretch    1. Stand with the balls of both feet on the edge of a step or curb (or a medium-sized phone book). With at least one hand, hold onto something solid for balance, such as a banister or handrail.   2. Keeping your affected leg straight, slowly let that heel hang down off of the step or curb until you feel a stretch in the back of your calf and/or Achilles area. Some of your weight should still be on the other leg. 3. Hold this position for at least 15 to 30 seconds. 4. Repeat 2 to 4 times a session, up to 5 times a day or whenever your Achilles tendon starts to feel tight. This stretch can also be done with your knee slightly bent. Strength exercise    1. This exercise will get you started on building strength after an Achilles tendon injury. Your doctor or physical therapist can help you move on to more challenging exercises as you heal and get stronger. 2. Stand on a step with your heel off the edge of the step. Hold on to a handrail or wall for balance. 3. Push up on your toes, then slowly count to 10 as you lower yourself back down until your heel is below the step. If it hurts to push up on your toes, try putting most of your weight on your other foot as you push up, or try using your arms to help you. If you can't do this exercise without causing pain, stop the exercise and talk to your doctor. 4. Repeat the exercise 8 to 12 times, half with the knee straight and half with the knee bent. Follow-up care is a key part of your treatment and safety. Be sure to make and go to all appointments, and call your doctor if you are having problems. It's also a good idea to know your test results and keep a list of the medicines you take. Where can you learn more? Go to http://marlene-willard.info/. Enter D767 in the search box to learn more about \"Achilles Tendon: Exercises. \"  Current as of: November 29, 2017  Content Version: 11.8  © 5881-2759 [employer]. Care instructions adapted under license by [employer] (which disclaims liability or warranty for this information).  If you have questions about a medical condition or this instruction, always ask your healthcare professional. Shirley Huston disclaims any warranty or liability for your use of this information.

## 2018-12-07 ENCOUNTER — HOSPITAL ENCOUNTER (OUTPATIENT)
Dept: PHYSICAL THERAPY | Age: 43
Discharge: HOME | End: 2018-12-07
Payer: COMMERCIAL

## 2018-12-07 PROCEDURE — 97110 THERAPEUTIC EXERCISES: CPT

## 2018-12-07 NOTE — PROGRESS NOTES
PHYSICAL THERAPY - DAILY TREATMENT NOTE    Patient Name: Thompson Escamilla        Date: 2018  : 1975   YES Patient  Verified  Visit #:     Insurance: Payor: Terrell Holliday / Plan: [facility] PPO / Product Type: PPO /      In time: 800 Out time: 900   Total Treatment Time: 55     Medicare/BCBS Time Tracking (below)   Total Timed Codes (min):  45 1:1 Treatment Time:  25     TREATMENT AREA =  Achilles tendinitis, right leg [M76.61]  SUBJECTIVE  Pain Level (on 0 to 10 scale):  3  / 10   Medication Changes/New allergies or changes in medical history, any new surgeries or procedures? NO    If yes, update Summary List   Subjective Functional Status/Changes:  []  No changes reported     I iced a ton yesterday and its feeling better.         OBJECTIVE  Modalities Rationale:     decrease inflammation, decrease pain and increase tissue extensibility to improve patient's ability to perform functional ADLs   min [] Estim, type/location:                                      []  att     []  unatt     []  w/US     []  w/ice    []  w/heat    min []  Mechanical Traction: type/lbs                   []  pro   []  sup   []  int   []  cont    []  before manual    []  after manual    min []  Ultrasound, settings/location:      min []  Iontophoresis w/ dexamethasone, location:                                               []  take home patch       []  in clinic   10 min [x]  Ice     []  Heat    location/position: R heel/AT in L/S    min []  Vasopneumatic Device, press/temp:     min []  Other:    [] Skin assessment post-treatment (if applicable):    []  intact    []  redness- no adverse reaction     []redness - adverse reaction:        45/25 min Therapeutic Exercise:  [x]  See flow sheet   Rationale:      increase ROM and increase strength to improve the patients ability to regain full functional mobility of R foot/ankle for improved walking tolerance      min Manual Therapy: Technique:      [] S/DTM []IASTM []PROM [] Passive Stretching   []manual TPR  [] TDN (see objective; actual needle insertion time not billed)  []Jt manipulation:Gr I [] II []  III [] IV[] V[]  Treatment/Area:     Rationale:      decrease pain, increase ROM, increase tissue extensibility and decrease trigger points to improve patient's ability to      min Therapeutic Activity:    Rationale:    increase strength, improve coordination and increase proprioception to improve the patients ability to      min Neuromuscular Re-ed:    Rationale:    improve coordination, improve balance and increase proprioception to improve the patients ability to      min Gait Training:    Rationale:       min Patient Education:  YES  Reviewed HEP   []  Progressed/Changed HEP based on: Other Objective/Functional Measures:    TE per FS, AROM INV cont to have ERP  Added RB PF/DF in standing, SR and soleus HR     Post Treatment Pain Level (on 0 to 10) scale:   1  / 10     ASSESSMENT  Assessment/Changes in Function:     Progressed treatment as appropriate with good patient tolerance     []  See Progress Note/Recertification   Patient will continue to benefit from skilled PT services to modify and progress therapeutic interventions, address functional mobility deficits, address ROM deficits, address strength deficits, analyze and address soft tissue restrictions, analyze and cue movement patterns, analyze and modify body mechanics/ergonomics and assess and modify postural abnormalities to attain remaining goals.    Progress toward goals / Updated goals:    Met STG1     PLAN  [x]  Upgrade activities as tolerated YES Continue plan of care   []  Discharge due to :    []  Other:      Therapist: Yovany Child PT, DPT, MTC, CMTPT    Date: 12/7/2018 Time: 7:09 PM     Future Appointments   Date Time Provider Attila Ford   12/10/2018  4:30 PM Tim Warner PT [facility]   12/12/2018  5:00 PM Tim Warner PT [facility]   12/17/2018  4:30 PM Tim Warner PT [place] [address]   12/19/2018  5:00 PM Delma Ulrich, PT [address]   1/16/2019  7:30 AM Kristel Edmondson MD [address] [address]

## 2018-12-10 ENCOUNTER — HOSPITAL ENCOUNTER (OUTPATIENT)
Dept: PHYSICAL THERAPY | Age: 43
Discharge: HOME | End: 2018-12-10
Payer: COMMERCIAL

## 2018-12-10 PROCEDURE — 97110 THERAPEUTIC EXERCISES: CPT

## 2018-12-10 NOTE — PROGRESS NOTES
PHYSICAL THERAPY - DAILY TREATMENT NOTE    Patient Name: Donta Law        Date: 12/10/2018  : 1975   YES Patient  Verified  Visit #:     Insurance: Payor: [employer] / Plan: [facility] PPO / Product Type: PPO /      In time: 435 Out time: 515   Total Treatment Time: 40     Medicare/BCBS Time Tracking (below)   Total Timed Codes (min):  40 1:1 Treatment Time:  40     TREATMENT AREA =  Achilles tendinitis, right leg [M76.61]  SUBJECTIVE  Pain Level (on 0 to 10 scale):  5  / 10   Medication Changes/New allergies or changes in medical history, any new surgeries or procedures?     NO    If yes, update Summary List   Subjective Functional Status/Changes:  []  No changes reported     I felt fine this morning but it was my first day back to work after 9 days and im sore sore in both heels with extra tightness in the calves       OBJECTIVE  Modalities Rationale:     decrease inflammation, decrease pain and increase tissue extensibility to improve patient's ability to perform functional ADLs   min [] Estim, type/location:                                      []  att     []  unatt     []  w/US     []  w/ice    []  w/heat    min []  Mechanical Traction: type/lbs                   []  pro   []  sup   []  int   []  cont    []  before manual    []  after manual    min []  Ultrasound, settings/location:      min []  Iontophoresis w/ dexamethasone, location:                                               []  take home patch       []  in clinic   @ home min [x]  Ice     []  Heat    location/position: R heel/AT in L/S    min []  Vasopneumatic Device, press/temp:     min []  Other:    [] Skin assessment post-treatment (if applicable):    []  intact    []  redness- no adverse reaction     []redness - adverse reaction:        40/40 min Therapeutic Exercise:  [x]  See flow sheet   Rationale:      increase ROM and increase strength to improve the patients ability to regain full functional mobility of R foot/ankle for improved walking tolerance      min Manual Therapy: Technique:      [] S/DTM []IASTM []PROM [] Passive Stretching   []manual TPR  [] TDN (see objective; actual needle insertion time not billed)  []Jt manipulation:Gr I [] II []  III [] IV[] V[]  Treatment/Area:     Rationale:      decrease pain, increase ROM, increase tissue extensibility and decrease trigger points to improve patient's ability to      min Therapeutic Activity:    Rationale:    increase strength, improve coordination and increase proprioception to improve the patients ability to      min Neuromuscular Re-ed:    Rationale:    improve coordination, improve balance and increase proprioception to improve the patients ability to      min Gait Training:    Rationale:       min Patient Education:  YES  Reviewed HEP   [x]  Progressed/Changed HEP based on:   Issued written HEP and reviewed     Other Objective/Functional Measures:    TE per FS, focused on hip flexibility/mobility stretching to improve gait mechanics     Post Treatment Pain Level (on 0 to 10) scale:   4  / 10     ASSESSMENT  Assessment/Changes in Function:     Progressed treatment as appropriate with good patient tolerance     []  See Progress Note/Recertification   Patient will continue to benefit from skilled PT services to modify and progress therapeutic interventions, address functional mobility deficits, address ROM deficits, address strength deficits, analyze and address soft tissue restrictions, analyze and cue movement patterns, analyze and modify body mechanics/ergonomics and assess and modify postural abnormalities to attain remaining goals.    Progress toward goals / Updated goals:    Met STG1 and partially met LTG1     PLAN  [x]  Upgrade activities as tolerated YES Continue plan of care   []  Discharge due to :    []  Other:      Therapist: Jesika Herzog, PT, DPT, MTC, CMTPT    Date: 12/10/2018 Time: 7:09 PM     Future Appointments   Date Time Provider Department [place]   12/12/2018  5:00 PM [place]ji [place] [facility] [place]   12/17/2018  4:30 PM [place] [facility] [place]   12/19/2018  5:00 PM [place] [facility] [place]   1/16/2019  7:30 AM Melissa Edmondson MD [address]

## 2018-12-12 ENCOUNTER — HOSPITAL ENCOUNTER (OUTPATIENT)
Dept: PHYSICAL THERAPY | Age: 43
Discharge: HOME | End: 2018-12-12
Payer: COMMERCIAL

## 2018-12-12 PROCEDURE — 97140 MANUAL THERAPY 1/> REGIONS: CPT

## 2018-12-12 PROCEDURE — 97110 THERAPEUTIC EXERCISES: CPT

## 2018-12-13 NOTE — PROGRESS NOTES
Request for use of Dry Needling/Intramuscular Manual Therapy  Patient: Le Hardy     Referral Source: Lurdes Carr MD  Diagnosis: Achilles tendinitis, right leg [M76.61]    : 1975  Date of initial visit: 18   Attended visits: 6  Missed Visits: 0    Based on findings from the physical therapy examination and evaluation, the evaluating therapist believes the patient, Le Hardy  would benefit from including Dry Needling as part of the plan of care. Dry needling is a treatment technique utilized in conjunction with other PT interventions to inactivate myofascial trigger points and the pain and dysfunction they cause. Dry Needling is an advanced procedure that requires additional training of intensive course work. PROCEDURE:   Solid filament sterile needle (typically 0.3mm/30 gauge) inserted into a trigger point   Repeated movements inactivate the trigger points, taking 30-60 seconds per site   Typically consists of 1 dry needling session per week and a possible second treatment including muscle re-education, flexibility, strengthening and other manual techniques to facilitate the benefits of dry needling     BENEFITS:   Inactivation of trigger points   Decreased pain   Increased muscle length   Improved movement patterns   Restoration of function POTENTIAL RISKS:   Post-needling soreness   Infection   Bruising/bleeding   Penetration of a nerve   Pneumothorax   All treating PTs have been thoroughly educated in avoiding adverse reactions    If you agree with this recommendation, please sign this form and fax it to us at [phone]. If you have questions or concerns regarding dry needling or any other treatment we may be providing, please contact us at [phone]    Thank you for allowing us to assist in the care of your patient.     David Cam, PT, DPT, MTC, CMTPT      2018 7:11 PM     NOTE TO PHYSICIAN:  PLEASE COMPLETE THE ORDERS BELOW AND   FAX TO [facility]: [phone]  If you are unable to process this request in 24 hours please contact our office:   [phone] I have read the above request and AGREE to the recommendation of including dry needling as part of the plan of care. ? I have read the above request and DO NOT AGREE to including dry needling as part of the plan of care.   ? I have read the above report and request that my patient continue therapy with the following changes/special instructions:  ______________________________________________________________________________________    Physicians signature: _______________________________________________     Date: ______________    Time:_______________

## 2018-12-13 NOTE — PROGRESS NOTES
PHYSICAL THERAPY - DAILY TREATMENT NOTE    Patient Name: Lenin Francois        Date: 2018  : 1975   YES Patient  Verified  Visit #:     Insurance: Payor: Verlyn [employer] / Plan: [facility] PPO / Product Type: PPO /      In time: 500 Out time: 555   Total Treatment Time: 50     Medicare/BCBS Time Tracking (below)   Total Timed Codes (min):  50 1:1 Treatment Time:  30     TREATMENT AREA =  Achilles tendinitis, right leg [M76.61]  SUBJECTIVE  Pain Level (on 0 to 10 scale):  3  / 10   Medication Changes/New allergies or changes in medical history, any new surgeries or procedures?     NO    If yes, update Summary List   Subjective Functional Status/Changes:  []  No changes reported     The stretches feel good but the burning after is still there       OBJECTIVE  Modalities Rationale:     decrease inflammation, decrease pain and increase tissue extensibility to improve patient's ability to perform functional ADLs   min [] Estim, type/location:                                      []  att     []  unatt     []  w/US     []  w/ice    []  w/heat    min []  Mechanical Traction: type/lbs                   []  pro   []  sup   []  int   []  cont    []  before manual    []  after manual    min []  Ultrasound, settings/location:      min []  Iontophoresis w/ dexamethasone, location:                                               []  take home patch       []  in clinic   @ home min [x]  Ice     []  Heat    location/position:     min []  Vasopneumatic Device, press/temp:     min []  Other:    [] Skin assessment post-treatment (if applicable):    []  intact    []  redness- no adverse reaction     []redness - adverse reaction:        40/20 min Therapeutic Exercise:  [x]  See flow sheet   Rationale:      increase ROM and increase strength to improve the patients ability to regain full functional mobility of R foot/ankle for improved walking tolerance     10 min Manual Therapy: Technique:      [x] S/DTM []IASTM []PROM [] Passive Stretching   [x]manual TPR  [] TDN (see objective; actual needle insertion time not billed)  []Jt manipulation:Gr I [] II []  III [] IV[] V[]  Treatment/Area:  prox mTPR to gastroc    Rationale:      decrease pain, increase ROM, increase tissue extensibility and decrease trigger points to improve patient's ability to improved walking     min Therapeutic Activity:    Rationale:    increase strength, improve coordination and increase proprioception to improve the patients ability to      min Neuromuscular Re-ed:    Rationale:    improve coordination, improve balance and increase proprioception to improve the patients ability to      min Gait Training:    Rationale:       min Patient Education:  YES  Reviewed HEP   [x]  Progressed/Changed HEP based on: Other Objective/Functional Measures:    TE per FS, focused on improving gastroc/soleus flexibility  Dec c/o burning when stretching after MT     Post Treatment Pain Level (on 0 to 10) scale:   3  / 10     ASSESSMENT  Assessment/Changes in Function:     Progressed treatment as appropriate with good patient tolerance     []  See Progress Note/Recertification   Patient will continue to benefit from skilled PT services to modify and progress therapeutic interventions, address functional mobility deficits, address ROM deficits, address strength deficits, analyze and address soft tissue restrictions, analyze and cue movement patterns, analyze and modify body mechanics/ergonomics and assess and modify postural abnormalities to attain remaining goals.    Progress toward goals / Updated goals:    Met STG1 and partially met LTG1     PLAN  [x]  Upgrade activities as tolerated YES Continue plan of care   []  Discharge due to :    [x]  Other: Plan to DN [place]     Therapist: Majorie Favre, PT, DPT, MTC, CMTPT    Date: 12/12/2018 Time: 7:09 PM     Future Appointments   Date Time Provider Attila Ford   12/17/2018  4:30 PM [place], [place] [facility]   12/21/2018  8:00 AM Kelsi Garcia PT [facility] [place]   1/16/2019  7:30 AM Brannon Edmondson MD [address]

## 2018-12-17 ENCOUNTER — HOSPITAL ENCOUNTER (OUTPATIENT)
Dept: PHYSICAL THERAPY | Age: 43
Discharge: HOME | End: 2018-12-17
Payer: COMMERCIAL

## 2018-12-17 PROCEDURE — 97140 MANUAL THERAPY 1/> REGIONS: CPT

## 2018-12-17 PROCEDURE — 97110 THERAPEUTIC EXERCISES: CPT

## 2018-12-17 NOTE — PROGRESS NOTES
PHYSICAL THERAPY - DAILY TREATMENT NOTE    Patient Name: Juanjose Lira        Date: 2018  : 1975   YES Patient  Verified  Visit #:     Insurance: Payor: Wendy Cobos / Plan: [facility] PPO / Product Type: PPO /      In time: 430 Out time: 520   Total Treatment Time: 50     Medicare/BCBS Time Tracking (below)   Total Timed Codes (min):  40 1:1 Treatment Time:  30     TREATMENT AREA =  Achilles tendinitis, right leg [M76.61]  SUBJECTIVE  Pain Level (on 0 to 10 scale):  2-4  / 10   Medication Changes/New allergies or changes in medical history, any new surgeries or procedures?     NO    If yes, update Summary List   Subjective Functional Status/Changes:  []  No changes reported     My calf felt better after LV but no change in the burning in my heel       OBJECTIVE  Modalities Rationale:     decrease inflammation, decrease pain and increase tissue extensibility to improve patient's ability to perform functional ADLs   min [] Estim, type/location:                                      []  att     []  unatt     []  w/US     []  w/ice    []  w/heat    min []  Mechanical Traction: type/lbs                   []  pro   []  sup   []  int   []  cont    []  before manual    []  after manual    min []  Ultrasound, settings/location:      min []  Iontophoresis w/ dexamethasone, location:                                               []  take home patch       []  in clinic   10 min [x]  Ice     []  Heat    location/position: R AT in long sit    min []  Vasopneumatic Device, press/temp:     min []  Other:    [] Skin assessment post-treatment (if applicable):    []  intact    []  redness- no adverse reaction     []redness - adverse reaction:        25/15 min Therapeutic Exercise:  [x]  See flow sheet   Rationale:      increase ROM and increase strength to improve the patients ability to regain full functional mobility of R foot/ankle for improved walking tolerance     15/15 min Manual Therapy: Technique:      [x] S/DTM [x]IASTM []PROM [] Passive Stretching   [x]manual TPR  [] TDN (see objective; actual needle insertion time not billed)  []Jt manipulation:Gr I [] II []  III [] IV[] V[]  Treatment/Area:  IASTM to medial gastroc, mTPR to med gastroc with active release   Rationale:      decrease pain, increase ROM, increase tissue extensibility and decrease trigger points to improve patient's ability to improved walking     min Therapeutic Activity:    Rationale:    increase strength, improve coordination and increase proprioception to improve the patients ability to      min Neuromuscular Re-ed:    Rationale:    improve coordination, improve balance and increase proprioception to improve the patients ability to      min Gait Training:    Rationale:       min Patient Education:  YES  Reviewed HEP   [x]  Progressed/Changed HEP based on: Other Objective/Functional Measures:    TE per FS, TTP along med gastroc, notably MT jxn and prox attachment     Post Treatment Pain Level (on 0 to 10) scale:   3  / 10     ASSESSMENT  Assessment/Changes in Function:     Cont to report burning after stretching gastroc/soleus despite slow improvements in flexibility     []  See Progress Note/Recertification   Patient will continue to benefit from skilled PT services to modify and progress therapeutic interventions, address functional mobility deficits, address ROM deficits, address strength deficits, analyze and address soft tissue restrictions, analyze and cue movement patterns, analyze and modify body mechanics/ergonomics and assess and modify postural abnormalities to attain remaining goals.    Progress toward goals / Updated goals:    Progressing towards STG3, 2-4/10 pain today     PLAN  [x]  Upgrade activities as tolerated YES Continue plan of care   []  Discharge due to :    [x]  Other: Plan to DN [place]     Therapist: Shawnee Logan, PT, DPT, MTC, CMTPT    Date: 12/17/2018 Time: 7:09 PM     Future Appointments   Date Time Provider Attila Ford   12/21/2018  8:00 AM Rudy Villalta [facility] [place]   1/16/2019  7:30 AM Didi Edmondson MD [address]

## 2018-12-19 ENCOUNTER — APPOINTMENT (OUTPATIENT)
Dept: PHYSICAL THERAPY | Age: 43
End: 2018-12-19
Payer: COMMERCIAL

## 2018-12-21 ENCOUNTER — APPOINTMENT (OUTPATIENT)
Dept: PHYSICAL THERAPY | Age: 43
End: 2018-12-21
Payer: COMMERCIAL

## 2019-01-11 ENCOUNTER — TELEPHONE (OUTPATIENT)
Dept: INTERNAL MEDICINE CLINIC | Age: 44
End: 2019-01-11

## 2019-01-11 NOTE — TELEPHONE ENCOUNTER
Pt called in stating that passed she passed her kidney stone at home. She is doing good. She did not go to the emergency room.

## 2019-01-11 NOTE — TELEPHONE ENCOUNTER
[address] called then handed phone to endy, but she is saying she has been vomiting and has severe pain, she thinks she has a kidney stone. After speaking with you, pt advised to go to ER because they can do the work up there for kidney stones and or check her for other infections for the kidney. She says ok, she will go. I also let her know we will follow and will get notes to what they are doing. She says to tell you thank you.

## 2019-02-13 NOTE — PROGRESS NOTES
[address] [place] [address] PHYSICAL THERAPY AT [address] [place] [address]  [address], [address] [place] [phone], [address]  Phone: [phone]  Fax: [phone] SUMMARY  Patient Name: Milena Wlid : 1975   Treatment/Medical Diagnosis: Achilles tendinitis, right leg [M76.61]   Referral Source: Chetna Milian MD     Date of Initial Visit: 18 Attended Visits: 7 Missed Visits: 0     SUMMARY OF TREATMENT  Therapeutic exercises including ROM, strengthening and stretching; manual therapy including joint and soft tissue manipulation; gait and balance training, postural re-education, modalities: CP, HEP instruction. CURRENT STATUS  Patient is a 37 y.o. female who presents to [facility] at [facility] with diagnosis of R Achilles tendinitis. The patient has not returned to PT since 18 despite attempts to contact patient and therefore current status is unknown. At her last attended visit she reported 2-4/10 pain. Pain levels had decreased since initial evaluation and gastroc/soleus flexibility had improved however muscle length remained limited. Ms. Ruperto Gonzalez continued to report a burning sensation along the Achilles tendon calcaneal attachment that worsened with prolonged standing/walking. RECOMMENDATIONS  Discontinue therapy due to lack of attendance or compliance. If you have any questions/comments please contact us directly at [phone]. Thank you for allowing us to assist in the care of your patient.     Therapist Signature: Maryam Ortega, ANGELA, DPT, MTC, CMTPT Date: 2019     Time: 1:02 PM

## 2020-07-17 PROBLEM — N20.0 KIDNEY STONE: Status: ACTIVE | Noted: 2020-07-17
